# Patient Record
Sex: MALE | Race: WHITE | NOT HISPANIC OR LATINO | Employment: FULL TIME | URBAN - METROPOLITAN AREA
[De-identification: names, ages, dates, MRNs, and addresses within clinical notes are randomized per-mention and may not be internally consistent; named-entity substitution may affect disease eponyms.]

---

## 2017-08-15 ENCOUNTER — OFFICE VISIT (OUTPATIENT)
Dept: NEUROLOGY | Facility: CLINIC | Age: 32
End: 2017-08-15
Payer: MEDICAID

## 2017-08-15 VITALS
SYSTOLIC BLOOD PRESSURE: 130 MMHG | WEIGHT: 142.88 LBS | BODY MASS INDEX: 20.46 KG/M2 | HEART RATE: 64 BPM | HEIGHT: 70 IN | DIASTOLIC BLOOD PRESSURE: 84 MMHG

## 2017-08-15 DIAGNOSIS — R20.0 NUMBNESS AND TINGLING: Primary | ICD-10-CM

## 2017-08-15 DIAGNOSIS — G56.03 BILATERAL CARPAL TUNNEL SYNDROME: ICD-10-CM

## 2017-08-15 DIAGNOSIS — R20.2 NUMBNESS AND TINGLING: Primary | ICD-10-CM

## 2017-08-15 PROCEDURE — 99203 OFFICE O/P NEW LOW 30 MIN: CPT | Mod: PBBFAC | Performed by: PSYCHIATRY & NEUROLOGY

## 2017-08-15 PROCEDURE — 99205 OFFICE O/P NEW HI 60 MIN: CPT | Mod: S$PBB,,, | Performed by: PSYCHIATRY & NEUROLOGY

## 2017-08-15 PROCEDURE — 3008F BODY MASS INDEX DOCD: CPT | Mod: ,,, | Performed by: PSYCHIATRY & NEUROLOGY

## 2017-08-15 PROCEDURE — 99999 PR PBB SHADOW E&M-NEW PATIENT-LVL III: CPT | Mod: PBBFAC,,, | Performed by: PSYCHIATRY & NEUROLOGY

## 2017-08-15 RX ORDER — GABAPENTIN 600 MG/1
600 TABLET ORAL 3 TIMES DAILY
COMMUNITY
End: 2017-12-25

## 2017-08-15 RX ORDER — GABAPENTIN 600 MG/1
600 TABLET ORAL 3 TIMES DAILY
Qty: 90 TABLET | Refills: 11 | Status: SHIPPED | OUTPATIENT
Start: 2017-08-15 | End: 2017-12-25

## 2017-08-15 NOTE — PROGRESS NOTES
Subjective:       Patient ID: Miguelangel Soto is a 32 y.o. male.    Chief Complaint:  Pain (left hand) and Numbness      HPI  This is a 32-year-old right-handed pleasant gentleman presented today in the   clinic with the complaint of shooting pain and also numbness in his left hand.    He said that he had cut injury on 07/06/2017 along the base of the index finger   of the left hand.  It was a sharp cutting injury which has been stitched and was   under bandaged for seven days.  After seven days, he noted that the proximal   half of her index finger on the radial side is numb and this numbness is all the   time.  There is no pain.  He also felt a sharp shooting like pain from the   middle of the left index finger, which comes and goes and lasts for seconds to   minute.  He is very concerned about his neurological episodes after the cut   injury.  He started taking gabapentin lately and frequency of the sharp pain has   declined.    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  In the   past, he used to work hard with lifting and loading-unloading and had   compression fracture including T1, T2 and T6 diagnosed in 2006.  Those problems   to some extent resolved.  He does not feel any weakness in his hand, but this   continuous bothersome is a concern to him.      Past Medical History:   Diagnosis Date    Alpha-1-antitrypsin deficiency carrier     Compression fracture of C-spine      History reviewed. No pertinent surgical history.  Family History   Problem Relation Age of Onset    Alpha-1 antitrypsin deficiency Mother     Diabetes Father     Heart disease Father     Diabetes Sister     No Known Problems Brother     No Known Problems Maternal Aunt     No Known Problems Maternal Uncle     No Known Problems Paternal Aunt     No Known Problems Paternal Uncle     No Known Problems Maternal Grandmother     No Known Problems Maternal Grandfather     No Known Problems Paternal  Grandmother     No Known Problems Paternal Grandfather     Aneurysm Neg Hx     Cancer Neg Hx     Clotting disorder Neg Hx     Dementia Neg Hx     Fainting Neg Hx     Hyperlipidemia Neg Hx     Kidney disease Neg Hx     Liver disease Neg Hx     Migraines Neg Hx     Neuropathy Neg Hx     Obesity Neg Hx     Parkinsonism Neg Hx     Seizures Neg Hx     Stroke Neg Hx     Tremor Neg Hx    .  Social History   Substance Use Topics    Smoking status: Current Every Day Smoker     Packs/day: 1.00    Smokeless tobacco: Not on file    Alcohol use Yes       Review of Systems   Constitutional: Negative.  Negative for activity change, appetite change, diaphoresis, fatigue, fever and unexpected weight change.   HENT: Negative for drooling, ear pain, facial swelling, hearing loss, mouth sores, sinus pressure, sneezing, sore throat, tinnitus, trouble swallowing and voice change.    Eyes: Negative.  Negative for photophobia, pain, discharge, redness, itching and visual disturbance.   Respiratory: Negative.  Negative for cough, chest tightness, shortness of breath and wheezing.    Cardiovascular: Negative.  Negative for chest pain, palpitations and leg swelling.   Gastrointestinal: Negative.  Negative for abdominal pain, anal bleeding, diarrhea, nausea and vomiting.   Endocrine: Negative.  Negative for cold intolerance, heat intolerance, polydipsia, polyphagia and polyuria.   Genitourinary: Negative.  Negative for difficulty urinating, dysuria, flank pain, frequency, testicular pain and urgency.   Musculoskeletal: Negative.  Negative for arthralgias, back pain, gait problem, joint swelling, myalgias, neck pain and neck stiffness.   Skin: Negative for color change, pallor, rash and wound.   Allergic/Immunologic: Negative for environmental allergies, food allergies and immunocompromised state.   Neurological: Positive for speech difficulty and numbness. Negative for dizziness, tremors, seizures, syncope, facial asymmetry,  weakness, light-headedness and headaches.        Shooting pain in the left index finger   Hematological: Negative.  Does not bruise/bleed easily.   Psychiatric/Behavioral: Negative.  Negative for agitation, behavioral problems, confusion, decreased concentration, dysphoric mood, hallucinations, self-injury, sleep disturbance and suicidal ideas. The patient is not nervous/anxious and is not hyperactive.        Objective:      Neurologic Exam     Mental Status   Oriented to person, place, and time.   Recall at 5 minutes: recalls 3 of 3 objects. Follows 3 step commands.   Speech: speech is normal   Level of consciousness: alert  Knowledge: good and consistent with education. Able to perform simple calculations.   Able to name object. Able to read. Able to repeat. Normal comprehension.     Cranial Nerves     CN II   Visual fields full to confrontation.   Visual acuity: normal    CN III, IV, VI   Pupils are equal, round, and reactive to light.  Extraocular motions are normal.   Right pupil: Shape: regular. Reactivity: brisk. Consensual response: intact. Accommodation: intact.   Left pupil: Shape: regular. Reactivity: brisk. Consensual response: intact. Accommodation: intact.   CN III: no CN III palsy  CN VI: no CN VI palsy  Nystagmus: none   Diplopia: none  Ophthalmoparesis: none  Upgaze: normal  Downgaze: normal  Conjugate gaze: present  Vestibulo-ocular reflex: present    CN VIII   CN VIII normal.     CN IX, X   CN IX normal.   CN X normal.   Palate: symmetric  Right gag reflex: normal  Left gag reflex: normal    CN XI   CN XI normal.     CN XII   CN XII normal.     Motor Exam   Muscle bulk: normal  Overall muscle tone: normal  Right arm tone: normal  Left arm tone: normal  Right leg tone: normal  Left leg tone: normal    Strength   Strength 5/5 throughout.     Sensory Exam   Left arm light touch: decreased from fingers  Left arm vibration: normal  Proprioception normal.   Left arm pinprick: decreased from  fingers  Sensory deficit distribution on right: ulnar  Sensory deficit distribution on left: median  Graphesthesia: normal  Stereognosis: normal  Proximal part of left index is numb on radial side . Tinel positive in both hands.  Ulnar side showed decreased sensation in the right hand and median side on the left hand.      Gait, Coordination, and Reflexes     Gait  Gait: normal    Coordination   Romberg: negative  Finger to nose coordination: normal  Heel to shin coordination: normal  Tandem walking coordination: normal    Tremor   Resting tremor: absent  Intention tremor: absent  Action tremor: absent    Reflexes   Right brachioradialis: 2+  Left brachioradialis: 2+  Right biceps: 2+  Left biceps: 2+  Right triceps: 2+  Left triceps: 2+  Right patellar: 2+  Left patellar: 2+  Right achilles: 2+  Left achilles: 2+  Right : 2+  Left : 2+  Right plantar: normal  Left plantar: normal  Right ankle clonus: absent  Left ankle clonus: absent                 Assessment:      1, CTS , clinically.  2. Numbness in the left index finger. May be digital nerve has been bruised in the index figure due to surgical maneuver .     Plan:    EMG/NCS  Continue Gabapentin.  Will see next month.

## 2018-05-28 ENCOUNTER — TELEPHONE (OUTPATIENT)
Dept: NEUROLOGY | Facility: CLINIC | Age: 33
End: 2018-05-28

## 2019-05-20 ENCOUNTER — OFFICE VISIT (OUTPATIENT)
Dept: NEUROLOGY | Facility: CLINIC | Age: 34
End: 2019-05-20
Payer: MEDICAID

## 2019-05-20 VITALS
WEIGHT: 148.13 LBS | DIASTOLIC BLOOD PRESSURE: 80 MMHG | SYSTOLIC BLOOD PRESSURE: 130 MMHG | RESPIRATION RATE: 18 BRPM | HEIGHT: 70 IN | BODY MASS INDEX: 21.21 KG/M2 | HEART RATE: 62 BPM

## 2019-05-20 DIAGNOSIS — R20.0 NUMBNESS AND TINGLING: ICD-10-CM

## 2019-05-20 DIAGNOSIS — R20.2 NUMBNESS AND TINGLING: ICD-10-CM

## 2019-05-20 DIAGNOSIS — G56.03 BILATERAL CARPAL TUNNEL SYNDROME: ICD-10-CM

## 2019-05-20 PROCEDURE — 99999 PR PBB SHADOW E&M-EST. PATIENT-LVL III: CPT | Mod: PBBFAC,,, | Performed by: PSYCHIATRY & NEUROLOGY

## 2019-05-20 PROCEDURE — 99213 OFFICE O/P EST LOW 20 MIN: CPT | Mod: PBBFAC | Performed by: PSYCHIATRY & NEUROLOGY

## 2019-05-20 PROCEDURE — 99214 PR OFFICE/OUTPT VISIT, EST, LEVL IV, 30-39 MIN: ICD-10-PCS | Mod: S$PBB,,, | Performed by: PSYCHIATRY & NEUROLOGY

## 2019-05-20 PROCEDURE — 99999 PR PBB SHADOW E&M-EST. PATIENT-LVL III: ICD-10-PCS | Mod: PBBFAC,,, | Performed by: PSYCHIATRY & NEUROLOGY

## 2019-05-20 PROCEDURE — 99214 OFFICE O/P EST MOD 30 MIN: CPT | Mod: S$PBB,,, | Performed by: PSYCHIATRY & NEUROLOGY

## 2019-05-20 RX ORDER — GABAPENTIN 800 MG/1
800 TABLET ORAL 3 TIMES DAILY
Qty: 90 TABLET | Refills: 11 | Status: SHIPPED | OUTPATIENT
Start: 2019-05-20 | End: 2020-05-19

## 2019-05-20 NOTE — PROGRESS NOTES
Subjective:       Follow up:Pain (left hand) and Numbness      HPI  His numbness is still there always in the left middle of index on the radial side . He is concerned that sometimes he feels numbness in both hands from the wrist down which is not constant. Gabapentin did help him .    Past Medical History:   Diagnosis Date    Alpha-1-antitrypsin deficiency carrier     Compression fracture of C-spine      History reviewed. No pertinent surgical history.  Family History   Problem Relation Age of Onset    Alpha-1 antitrypsin deficiency Mother     Diabetes Father     Heart disease Father     Diabetes Sister     No Known Problems Brother     No Known Problems Maternal Aunt     No Known Problems Maternal Uncle     No Known Problems Paternal Aunt     No Known Problems Paternal Uncle     No Known Problems Maternal Grandmother     No Known Problems Maternal Grandfather     No Known Problems Paternal Grandmother     No Known Problems Paternal Grandfather     Aneurysm Neg Hx     Cancer Neg Hx     Clotting disorder Neg Hx     Dementia Neg Hx     Fainting Neg Hx     Hyperlipidemia Neg Hx     Kidney disease Neg Hx     Liver disease Neg Hx     Migraines Neg Hx     Neuropathy Neg Hx     Obesity Neg Hx     Parkinsonism Neg Hx     Seizures Neg Hx     Stroke Neg Hx     Tremor Neg Hx    .  Social History   Substance Use Topics    Smoking status: Current Every Day Smoker     Packs/day: 1.00    Smokeless tobacco: Not on file    Alcohol use Yes       Review of Systems   Constitutional: Negative for fever and weight loss.   HENT: Negative for hearing loss.    Eyes: Negative for blurred vision, double vision, photophobia and pain.   Respiratory: Negative for cough.    Cardiovascular: Negative for chest pain.   Gastrointestinal: Negative for abdominal pain, nausea and vomiting.   Genitourinary: Negative for dysuria, frequency and urgency.   Musculoskeletal: Negative.  Negative for back pain, falls, joint  pain, myalgias and neck pain.   Skin: Negative for itching and rash.   Neurological: Negative for tingling and headaches.        Numbness in left index finger and both hands    Psychiatric/Behavioral: Negative for depression and memory loss.       Objective:      Neurologic Exam     Mental Status   Oriented to person, place, and time.   Recall at 5 minutes: recalls 3 of 3 objects. Follows 3 step commands.   Speech: speech is normal   Level of consciousness: alert  Knowledge: good and consistent with education. Able to perform simple calculations.   Able to name object. Able to read. Able to repeat. Normal comprehension.     Cranial Nerves     CN II   Visual fields full to confrontation.   Visual acuity: normal    CN III, IV, VI   Pupils are equal, round, and reactive to light.  Extraocular motions are normal.   Right pupil: Shape: regular. Reactivity: brisk. Consensual response: intact. Accommodation: intact.   Left pupil: Shape: regular. Reactivity: brisk. Consensual response: intact. Accommodation: intact.   CN III: no CN III palsy  CN VI: no CN VI palsy  Nystagmus: none   Diplopia: none  Ophthalmoparesis: none  Upgaze: normal  Downgaze: normal  Conjugate gaze: present  Vestibulo-ocular reflex: present    CN VIII   CN VIII normal.     CN IX, X   CN IX normal.   CN X normal.   Palate: symmetric  Right gag reflex: normal  Left gag reflex: normal    CN XI   CN XI normal.     CN XII   CN XII normal.     Motor Exam   Muscle bulk: normal  Overall muscle tone: normal  Right arm tone: normal  Left arm tone: normal  Right leg tone: normal  Left leg tone: normal    Strength   Strength 5/5 throughout.     Sensory Exam   Left arm light touch: decreased from fingers  Left arm vibration: normal  Proprioception normal.   Left arm pinprick: decreased from fingers  Sensory deficit distribution on right: ulnar  Sensory deficit distribution on left: median  Graphesthesia: normal  Stereognosis: normal  Proximal part of left index is  numb on radial side . Tinel positive in both hands.  Median side showed decreased sensation in the right and in  left hand.      Gait, Coordination, and Reflexes     Gait  Gait: normal    Coordination   Romberg: negative  Finger to nose coordination: normal  Heel to shin coordination: normal  Tandem walking coordination: normal    Tremor   Resting tremor: absent  Intention tremor: absent  Action tremor: absent    Reflexes   Right brachioradialis: 2+  Left brachioradialis: 2+  Right biceps: 2+  Left biceps: 2+  Right triceps: 2+  Left triceps: 2+  Right patellar: 2+  Left patellar: 2+  Right achilles: 2+  Left achilles: 2+  Right : 2+  Left : 2+  Right plantar: normal  Left plantar: normal  Right ankle clonus: absent  Left ankle clonus: absent                 Assessment:      1, CTS , clinically.  2. Numbness in the left index finger. May be digital nerve has been bruised in the index figure due to surgical maneuver .     Plan:    EMG/NCS  Continue Gabapentin.  Will see next month.

## 2019-05-27 ENCOUNTER — PROCEDURE VISIT (OUTPATIENT)
Dept: NEUROLOGY | Facility: CLINIC | Age: 34
End: 2019-05-27
Payer: MEDICAID

## 2019-05-27 DIAGNOSIS — G56.03 BILATERAL CARPAL TUNNEL SYNDROME: ICD-10-CM

## 2019-05-27 DIAGNOSIS — R20.0 NUMBNESS AND TINGLING: ICD-10-CM

## 2019-05-27 DIAGNOSIS — R20.2 NUMBNESS AND TINGLING: ICD-10-CM

## 2019-05-27 PROCEDURE — 95911 PR NERVE CONDUCTION STUDY; 9-10 STUDIES: ICD-10-PCS | Mod: 26,S$PBB,, | Performed by: PSYCHIATRY & NEUROLOGY

## 2019-05-27 PROCEDURE — 95911 NRV CNDJ TEST 9-10 STUDIES: CPT | Mod: 26,S$PBB,, | Performed by: PSYCHIATRY & NEUROLOGY

## 2019-05-27 PROCEDURE — 95911 NRV CNDJ TEST 9-10 STUDIES: CPT | Mod: PBBFAC | Performed by: PSYCHIATRY & NEUROLOGY

## 2019-05-27 PROCEDURE — 95910 NRV CNDJ TEST 7-8 STUDIES: CPT | Mod: PBBFAC

## 2019-05-27 NOTE — PROCEDURES
[facility]   [facility]  Department of Neurology  [address] [place] [address] [facility] [place]  [address]  Phone [phone]     Fax  [phone]        Patient: Brittany TILLMAN YOB: 1985  Patient ID: [id number] Age: 34 Years 3 Months  Sex: Male Ref. Provider: Isael Juarez MD  Notes: Patient cut hand in 2017 and has residual numbness and tingling in left index finger.  Also has numbness in bilateral hands, especially in thumbs and index fingers x several years, worsening now.  No decreased  per patient.    SUMMARY      Nerve conduction studies were performed in the left and right upper extremities. The left median motor study recording the abductor pollicis brevis showed a normal amplitude, normal distal latency and normal conduction velocity. The left ulnar motor study recording the abductor digiti minimi showed a normal amplitude, normal distal latency and normal conduction velocity. No conduction block or focal slowing was present across the elbow.       The left median sensory response recording digit two showed a normal amplitude, latency and conduction velocity. The left ulnar sensory response recording digit five showed a normal amplitude, latency and conduction velocity. The left radial sensory response recording over the extensor snuff box showed a normal amplitude, latency and conduction velocity.     The right median motor study recording the abductor pollicis brevis showed a normal amplitude, normal distal latency and normal conduction velocity.       The right median sensory response recording digit two showed a normal amplitude, latency and conduction velocity.     As routine median motor and sensory studies have a false negative rate of 25%, additional internal comparison studies were done to assess for possible median neuropathy at the wrist. These internal comparison studies (median vs. ulnar palmar mixed; median vs. ulnar sensory recording digit four; median vs. ulnar  motor studies to the second lumbrical / interosseous; median vs. radial sensory studies recording digit one; median segmental sensory studies comparing the wrist-palm and palm-digit velocities) increase the electrodiagnostic sensitivity rate to 95%. However, due to statistical issues with multiple tests, it is required that at least two studies are abnormal to reduce the false positive rate to acceptable levels. Left median-ulnar mixed palmar latencies showed no significant difference.       Right median-ulnar mixed palmar latencies showed no significant difference.       IMPRESSION       This is a normal study.                   ---------------------------------------------------------------------------------                     Priyanka Zamora M.D., F.A.A.N.      Diplomate, American Board of Psychiatry and Neurology  Diplomate, American Board of Clinical Neurophysiology   Fellow, American Academy of Neurology         Sensory NCS      Nerve / Sites Rec. Site Onset Peak NP Amp PP Amp Dist Jevon d Lat.2     ms ms µV µV cm m/s ms   L MEDIAN - Dig II      Wrist II 2.86 3.39 21.8 17.4 14 48.9 3.39      Ref.   3.60  17.0  48.0    R MEDIAN - Dig II      Wrist II 2.97 3.54 21.3 19.5 14 47.2 3.54      Ref.   3.60  17.0  48.0    L ULNAR - Dig V      Wrist Dig V 2.60 3.44 16.5 13.4 14 53.8 3.44      Ref.   3.50  15.0  48.0    L MEDIAN - Ulnar - Palmar      Median Wrist 1.72 2.34 54.3 84.0 8 46.5 2.34      Ref.  2.20            Ulnar Wrist 1.41 2.29 27.7 32.7 8 256.0 -0.05   R MEDIAN - Ulnar - Palmar      Median Wrist 1.93 2.45 44.7 43.5 8 41.5 2.45      Ref.  2.20            Ulnar Wrist 1.72 2.24 11.3 11.1 8 384.0 -0.21   L RADIAL - Snuff      Forearm Snuff 1.82 2.71 36.5 30.7   2.71       Motor NCS      Nerve / Sites Rec. Site Lat Amp Dist Jevon     ms mV cm m/s   L MEDIAN - APB      Wrist APB 3.23 6.2 8       Ref.  4.20 6.0        Elbow APB 8.07 5.5 25 51.6      Ref.     49.0   R MEDIAN - APB      Wrist APB 3.75 6.2 8       Ref.   4.20 6.0        Elbow APB 8.33 6.0 26 56.7      Ref.     49.0   L ULNAR - ADM      Wrist ADM 2.97 12.1 8       Ref.  3.20 5.0        B.Elbow ADM 6.77 11.9 20 52.6      Ref.     50.0      A.Elbow ADM 8.91 11.5 12 56.2

## 2020-04-15 ENCOUNTER — HOSPITAL ENCOUNTER (EMERGENCY)
Facility: HOSPITAL | Age: 35
Discharge: HOME | End: 2020-04-16
Attending: EMERGENCY MEDICINE
Payer: MEDICAID

## 2020-04-15 VITALS
DIASTOLIC BLOOD PRESSURE: 84 MMHG | OXYGEN SATURATION: 99 % | SYSTOLIC BLOOD PRESSURE: 134 MMHG | HEIGHT: 70 IN | WEIGHT: 131.38 LBS | RESPIRATION RATE: 18 BRPM | TEMPERATURE: 98 F | HEART RATE: 74 BPM | BODY MASS INDEX: 18.81 KG/M2

## 2020-04-15 DIAGNOSIS — S39.012A LUMBAR STRAIN, INITIAL ENCOUNTER: Primary | ICD-10-CM

## 2020-04-15 DIAGNOSIS — M62.830 SPASM OF MUSCLE OF LOWER BACK: ICD-10-CM

## 2020-04-15 DIAGNOSIS — Z72.0 TOBACCO ABUSE DISORDER: ICD-10-CM

## 2020-04-15 PROCEDURE — 96372 THER/PROPH/DIAG INJ SC/IM: CPT

## 2020-04-15 PROCEDURE — 25000003 PHARM REV CODE 250: Performed by: EMERGENCY MEDICINE

## 2020-04-15 PROCEDURE — 99284 EMERGENCY DEPT VISIT MOD MDM: CPT | Mod: 25

## 2020-04-15 PROCEDURE — 63600175 PHARM REV CODE 636 W HCPCS: Performed by: EMERGENCY MEDICINE

## 2020-04-15 RX ORDER — KETOROLAC TROMETHAMINE 30 MG/ML
60 INJECTION, SOLUTION INTRAMUSCULAR; INTRAVENOUS
Status: COMPLETED | OUTPATIENT
Start: 2020-04-15 | End: 2020-04-15

## 2020-04-15 RX ORDER — DIAZEPAM 5 MG/1
5 TABLET ORAL
Status: COMPLETED | OUTPATIENT
Start: 2020-04-15 | End: 2020-04-15

## 2020-04-15 RX ORDER — METHYLPREDNISOLONE SOD SUCC 125 MG
125 VIAL (EA) INJECTION
Status: COMPLETED | OUTPATIENT
Start: 2020-04-15 | End: 2020-04-15

## 2020-04-15 RX ADMIN — DIAZEPAM 5 MG: 5 TABLET ORAL at 11:04

## 2020-04-15 RX ADMIN — METHYLPREDNISOLONE SODIUM SUCCINATE 125 MG: 125 INJECTION, POWDER, FOR SOLUTION INTRAMUSCULAR; INTRAVENOUS at 11:04

## 2020-04-15 RX ADMIN — KETOROLAC TROMETHAMINE 60 MG: 60 INJECTION, SOLUTION INTRAMUSCULAR at 11:04

## 2020-04-15 NOTE — ED PROVIDER NOTES
SCRIBE #1 NOTE: I, Ирина Reyes, am scribing for, and in the presence of, Madison Woodard MD. I have scribed the entire note.       History     Chief Complaint   Patient presents with    Back Pain     x 4 days, hurt it at work     Review of patient's allergies indicates:  No Known Allergies      History of Present Illness     HPI    4/15/2020, 11:25 AM  History obtained from the patient      History of Present Illness: Miguelangel Soto is a 35 y.o. male patient with a h/o compression fractures of C-spine, lower back pain, who presents to the Emergency Department for evaluation of lower back pain which onset 4 days ago. 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 He denies any neurological complaints. Pt reports sxs worsen when moving. No mitigating or exacerbating factors reported. No associated sxs reported Patient denies any urinary incontinence, saddle anesthesia, fever, chills, and all other sxs at this time. No prior Tx reported. No further complaints or concerns at this time.       Arrival mode: Personal transportation     PCP: Primary Doctor No      Past Medical History:  Past Medical History:   Diagnosis Date    Alpha-1-antitrypsin deficiency carrier     Compression fracture of C-spine        Past Surgical History:  History reviewed. No pertinent surgical history.      Family History:  Family History   Problem Relation Age of Onset    Alpha-1 antitrypsin deficiency Mother     Diabetes Father     Heart disease Father     Diabetes Sister     No Known Problems Brother     No Known Problems Maternal Aunt     No Known Problems Maternal Uncle     No Known Problems Paternal Aunt     No Known Problems Paternal Uncle     No Known Problems Maternal Grandmother     No Known Problems Maternal Grandfather     No Known Problems Paternal Grandmother     No Known Problems Paternal Grandfather     Aneurysm Neg Hx     Cancer Neg Hx     Clotting disorder Neg  Hx     Dementia Neg Hx     Fainting Neg Hx     Hyperlipidemia Neg Hx     Kidney disease Neg Hx     Liver disease Neg Hx     Migraines Neg Hx     Neuropathy Neg Hx     Obesity Neg Hx     Parkinsonism Neg Hx     Seizures Neg Hx     Stroke Neg Hx     Tremor Neg Hx        Social History:   Social History     Tobacco Use    Smoking status: Current Every Day Smoker     Packs/day: 1.00   Substance and Sexual Activity    Alcohol use: Yes    Drug use: No    Sexual activity: Never        Review of Systems     Review of Systems   Constitutional: Negative for chills and fever.   HENT: Negative for sore throat.    Respiratory: Negative for shortness of breath.    Cardiovascular: Negative for chest pain.   Gastrointestinal: Negative for diarrhea, nausea and vomiting.   Genitourinary: Negative for dysuria.        (-) urinary incontinence   Musculoskeletal: Positive for back pain (lower).   Skin: Negative for rash.   Neurological: Negative for headaches.        (-) saddle anesthesia   Hematological: Does not bruise/bleed easily.   All other systems reviewed and are negative.       Physical Exam     Initial Vitals [04/15/20 1058]   BP Pulse Resp Temp SpO2   138/86 70 20 98.3 °F (36.8 °C) 99 %      MAP       --          Physical Exam  Nursing Notes and Vital Signs Reviewed.  Constitutional: Well-developed and well-nourished. Mild distress.  Head: Atraumatic. Normocephalic.  Eyes: EOM intact. No scleral icterus.  ENT: Mucous membranes are moist. Oropharynx is clear and symmetric.    Neck: Supple. Full ROM. No lymphadenopathy.  Cardiovascular: Regular rate. Regular rhythm. No murmurs, rubs, or gallops. Distal pulses are 2+ and symmetric.  Pulmonary/Chest: No respiratory distress. Clear to auscultation bilaterally. No wheezing or rales.  Abdominal: Soft and non-distended.  There is no tenderness.  No rebound, guarding, or rigidity. Good bowel sounds.  Genitourinary: No CVA tenderness  Musculoskeletal: Pt reports midline  "lower lumbar tenderness to palpation. No paraspinal tenderness. Able to move legs without difficulty and transfer on his side on his own. Negative straight leg test. Moves all extremities. No obvious deformities.   Skin: Warm and dry.  Neurological:  Alert, awake, and appropriate.  Normal speech.  No acute focal neurological deficits are appreciated.  Psychiatric: Normal affect. Good eye contact. Appropriate in content.     ED Course   Procedures  ED Vital Signs:  Vitals:    04/15/20 1058 04/15/20 1111 04/15/20 1213   BP: 138/86  134/84   Pulse: 70  74   Resp: 20  18   Temp: 98.3 °F (36.8 °C)  98.4 °F (36.9 °C)   TempSrc: Oral  Oral   SpO2: 99%  99%   Weight:  59.6 kg (131 lb 6.3 oz)    Height: 5' 10" (1.778 m)         Abnormal Lab Results:  Labs Reviewed   HIV 1 / 2 ANTIBODY          The Emergency Provider reviewed the vital signs and test results, which are outlined above.     ED Discussion     12:32 PM: Pt is cusing, using expletives b/c he wanted an xray and he is not getting narcotics. Explained to pt not clinically indicated and pt continued to be argumentive and cursing, through urinal with urine in it all over the room. Pt will be escorted out by security.    12:34 PM: Reassessed pt at this time.  Pt states his condition has improved at this time. Discussed with pt all pertinent ED information and results. Discussed pt dx and plan of tx. Gave pt all f/u and return to the ED instructions. All questions and concerns were addressed at this time. Pt expresses understanding of information and instructions, and is comfortable with plan to discharge. Pt is stable for discharge.    I discussed with patient and/or family/caretaker that evaluation in the ED does not suggest any emergent or life threatening medical conditions requiring immediate intervention beyond what was provided in the ED, and I believe patient is safe for discharge.  Regardless, an unremarkable evaluation in the ED does not preclude the " development or presence of a serious of life threatening condition. As such, patient was instructed to return immediately for any worsening or change in current symptoms.    12:34 PM: PT ambulated without distress out of ER.       MDM        Medical Decision Making:   Clinical Tests:   Radiological Study: Ordered and Reviewed     Additional MDM:   Smoking Cessation: The patient was counseled on tobacco related  health complications.        ED Medication(s):  Medications   ketorolac injection 60 mg (60 mg Intramuscular Given 4/15/20 1127)   methylPREDNISolone sodium succinate injection 125 mg (125 mg Intramuscular Given 4/15/20 1126)   diazePAM tablet 5 mg (5 mg Oral Given 4/15/20 1128)       New Prescriptions    No medications on file       Follow-up Information     [facility]. Schedule an appointment as soon as possible for a visit in 2 days.    Why:  Return to the Emergency Room, If symptoms worsen  Contact information:  [address] [facility] [address]  [phone]                       Scribe Attestation:   Scribe #1: I performed the above scribed service and the documentation accurately describes the services I performed. I attest to the accuracy of the note.     Attending:   Physician Attestation Statement for Scribe #1: I, Madison Woodard MD, personally performed the services described in this documentation, as scribed by Ирина Reyes, in my presence, and it is both accurate and complete.           Clinical Impression       ICD-10-CM ICD-9-CM   1. Lumbar strain, initial encounter S39.012A 847.2   2. Spasm of muscle of lower back M62.830 724.8   3. Tobacco abuse disorder Z72.0 305.1       Disposition:   Disposition: Discharged  Condition: Stable         Madison Woodard MD  04/15/20 1237

## 2020-04-15 NOTE — ED NOTES
"Pt was heard screaming obscenities at physician in the ED room - when I approached the room to see what was happening, the physician came out and said "sir, you can go ahead and leave then!" Pt then kicked the bedside table and dumped urine from his urinal on the floor. I called for security to escort the patient out. While the patient slowly kept walking down the hallway toward the lobby he screamed at someone on his cell phone to come get him from the ER. Pt screaming on the phone stating that we did nothing for him here and then stopped at the nurses station and screamed "you dumb bitch!!" at the physician. Myself and 2 other nurses saw him to the lobby where security then escorted him out the front door.  "

## 2020-04-15 NOTE — ED NOTES
Patient identifiers verified and correct for Miguelangel Soto.    Pt reports while at work 4 days ago, he was unloading lumber off of a fork lift and her a pop in his back. States now he is having sever pain in the center of his lower back that radiates into his buttock. Pt able to move all extremities and has positive sensation in all. Pedal pulses WNL. Pt moaning in pain.     LOC: The patient is awake, alert and aware of environment. The patient is oriented x 3 and speaking appropriately.  APPEARANCE: Patient is clean and well groomed, patient's clothing is properly fastened.  SKIN: The skin is warm and dry, color consistent with ethnicity, patient has normal skin turgor and moist mucus membranes, skin intact, no breakdown or bruising noted.  MUSCULOSKELETAL: Patient moving all extremities spontaneously.  RESPIRATORY: Airway is open and patent, respirations are spontaneous.  CARDIAC: Patient has a normal rate, no periphreal edema noted, capillary refill < 3 seconds.  ABDOMEN: Soft and non tender to palpation.